# Patient Record
Sex: FEMALE | Race: WHITE
[De-identification: names, ages, dates, MRNs, and addresses within clinical notes are randomized per-mention and may not be internally consistent; named-entity substitution may affect disease eponyms.]

---

## 2017-09-06 ENCOUNTER — HOSPITAL ENCOUNTER (OUTPATIENT)
Dept: HOSPITAL 92 - SDC | Age: 72
Discharge: HOME | End: 2017-09-06
Attending: NEUROLOGICAL SURGERY
Payer: MEDICARE

## 2017-09-06 VITALS — BODY MASS INDEX: 0.1 KG/M2

## 2017-09-06 DIAGNOSIS — M43.16: Primary | ICD-10-CM

## 2017-09-06 DIAGNOSIS — J44.9: ICD-10-CM

## 2017-09-06 DIAGNOSIS — F17.210: ICD-10-CM

## 2017-09-06 DIAGNOSIS — E66.9: ICD-10-CM

## 2017-09-06 DIAGNOSIS — Z98.49: ICD-10-CM

## 2017-09-06 DIAGNOSIS — I10: ICD-10-CM

## 2017-09-06 DIAGNOSIS — Z98.890: ICD-10-CM

## 2017-09-06 DIAGNOSIS — E78.5: ICD-10-CM

## 2017-09-06 DIAGNOSIS — M54.16: ICD-10-CM

## 2017-09-06 DIAGNOSIS — Z79.899: ICD-10-CM

## 2017-09-06 NOTE — HP
HISTORY OF PRESENT ILLNESS:  Ms. Ryder presents for history of low back pain as well as bilateral
 buttock and radiating left lower extremity pains.  She has previously had an L5-S1 decompression pe
rformed elsewhere many years ago.  MRI recently performed reveals lumbar stenosis at L4-L5 as well a
s grade I spondylolisthesis at L4-L5 _____ that appears to be mobile on flexion and extension films.
  Even treating this with medications and physical therapy.  She did not want to do more epidural st
eroid injections citing adverse reactions and would prefer to move forward with surgery if we deem i
t is a possibility.

 

PAST MEDICAL HISTORY:  Significant for hypertension and hyperlipidemia.

 

CURRENT MEDICATIONS:  Lisinopril and simvastatin.

 

ALLERGIES:  No known drug allergies.

 

PAST SURGICAL HISTORY:  Previous laminectomy.

 

PHYSICAL EXAMINATION:

NEUROLOGIC:  The patient is alert and oriented x3.  Gait is severely antalgic and slow.

 

ASSESSMENT:  Lumbar spinal stenosis and spondylolisthesis.

 

PLAN:  Dr. Brice met with the patient, reviewed imaging and ultimately advocated for L4 through S1 f
acetectomy and fusion.  He explained to the patient the risks, benefits, and alternatives of the pro
cedure.  The patient expressed understanding and would like to move forward with surgery as rula avalos.  I do believe the patient is mentally competent and capable of making medical decisions for herse
lf and we will move forward with surgery as planned.

 

This is Pavan Santo PA-C dictating for Dr. Brice.

## 2018-07-24 ENCOUNTER — HOSPITAL ENCOUNTER (OUTPATIENT)
Dept: HOSPITAL 92 - SDC | Age: 73
Discharge: HOME | End: 2018-07-24
Attending: INTERNAL MEDICINE
Payer: MEDICARE

## 2018-07-24 DIAGNOSIS — K57.30: ICD-10-CM

## 2018-07-24 DIAGNOSIS — D12.3: ICD-10-CM

## 2018-07-24 DIAGNOSIS — I10: ICD-10-CM

## 2018-07-24 DIAGNOSIS — K64.9: ICD-10-CM

## 2018-07-24 DIAGNOSIS — D12.2: ICD-10-CM

## 2018-07-24 DIAGNOSIS — Z79.899: ICD-10-CM

## 2018-07-24 DIAGNOSIS — Z79.82: ICD-10-CM

## 2018-07-24 DIAGNOSIS — Z12.11: Primary | ICD-10-CM

## 2018-07-24 PROCEDURE — 0DBK8ZX EXCISION OF ASCENDING COLON, VIA NATURAL OR ARTIFICIAL OPENING ENDOSCOPIC, DIAGNOSTIC: ICD-10-PCS | Performed by: INTERNAL MEDICINE

## 2018-07-24 PROCEDURE — 88305 TISSUE EXAM BY PATHOLOGIST: CPT

## 2018-07-24 PROCEDURE — 0D5L8ZZ DESTRUCTION OF TRANSVERSE COLON, VIA NATURAL OR ARTIFICIAL OPENING ENDOSCOPIC: ICD-10-PCS | Performed by: INTERNAL MEDICINE

## 2018-07-24 NOTE — OP
DATE OF PROCEDURE:  07/24/2018

 

OPERATIVE PROCEDURE:  Colonoscopy with polypectomy, colonoscopy with biopsy.

 

PREOPERATIVE DIAGNOSIS:  Colon cancer screening.

 

POSTOPERATIVE DIAGNOSES:

1.  Broad based sessile polyp, ascending colon.

2.  Sessile polyp transverse colon.

3.  Sigmoid diverticular disease.

4.  Hemorrhoids.

 

PROCEDURE NOTE:  The patient was placed on her left lateral position and was given sedation by Anesth
esia Department.  A rectal exam was done before the scope was advanced into the rectum.  No lesions f
elt on rectal exam.  A Pentax video colonoscope was introduced into the rectum and advanced all the w
ay into the cecum.  The prep was good.  The mucosa appears normal throughout the colon.  The ileoceca
l area, cecum, no pathology seen.  A sessile ascending colon polyp removed with biopsy forceps.  The 
hepatic flexure, no lesions.  She has broad based sessile polyp over the transverse colon removed wit
h cautery with good hemostasis.  The splenic flexure and descending colon, no pathology seen.  The si
gmoid colon showed scattered diverticulosis.  Rectum showed hemorrhoids.

 

DISCHARGE PLANNING:  This is a 72-year-old  female who came for a colonoscopy.  She underwen
t a colonoscopic polypectomy and biopsy.

 

DISCHARGE RECOMMENDATIONS:

1.  The patient advised to call me if you have abdominal pain, hematochezia or fever.

2.  In the absence of any other symptoms, the patient to come back to me in 2 weeks.

3.  The patient will probably come back for a repeat colonoscopy and I believe 3 years.

4.  I will avail the biopsy report and get further recommendation.